# Patient Record
Sex: MALE | Race: WHITE | URBAN - METROPOLITAN AREA
[De-identification: names, ages, dates, MRNs, and addresses within clinical notes are randomized per-mention and may not be internally consistent; named-entity substitution may affect disease eponyms.]

---

## 2019-04-15 ENCOUNTER — CONVERSION ENCOUNTER (OUTPATIENT)
Dept: GASTROENTEROLOGY | Facility: CLINIC | Age: 54
End: 2019-04-15
Attending: INTERNAL MEDICINE

## 2021-01-14 ENCOUNTER — OFFICE VISIT CONVERTED (OUTPATIENT)
Dept: ORTHOPEDIC SURGERY | Facility: CLINIC | Age: 56
End: 2021-01-14
Attending: ORTHOPAEDIC SURGERY

## 2021-01-20 ENCOUNTER — HOSPITAL ENCOUNTER (OUTPATIENT)
Dept: FAMILY MEDICINE CLINIC | Facility: CLINIC | Age: 56
Discharge: HOME | End: 2021-01-20
Attending: PHYSICIAN ASSISTANT

## 2021-01-21 LAB — SARS-COV-2 RNA SPEC QL NAA+PROBE: NOT DETECTED

## 2021-01-25 ENCOUNTER — HOSPITAL ENCOUNTER (OUTPATIENT)
Dept: PERIOP | Facility: HOSPITAL | Age: 56
Setting detail: HOSPITAL OUTPATIENT SURGERY
Discharge: HOME | End: 2021-01-25
Attending: ORTHOPAEDIC SURGERY

## 2021-02-01 ENCOUNTER — HOSPITAL ENCOUNTER (OUTPATIENT)
Dept: PHYSICAL THERAPY | Facility: CLINIC | Age: 56
Setting detail: RECURRING SERIES
Discharge: HOME | End: 2021-04-06
Attending: ORTHOPAEDIC SURGERY

## 2021-02-09 ENCOUNTER — CONVERSION ENCOUNTER (OUTPATIENT)
Dept: ORTHOPEDIC SURGERY | Facility: CLINIC | Age: 56
End: 2021-02-09

## 2021-02-09 ENCOUNTER — OFFICE VISIT CONVERTED (OUTPATIENT)
Dept: ORTHOPEDIC SURGERY | Facility: CLINIC | Age: 56
End: 2021-02-09
Attending: ORTHOPAEDIC SURGERY

## 2021-03-09 ENCOUNTER — OFFICE VISIT CONVERTED (OUTPATIENT)
Dept: ORTHOPEDIC SURGERY | Facility: CLINIC | Age: 56
End: 2021-03-09
Attending: PHYSICIAN ASSISTANT

## 2021-03-09 ENCOUNTER — CONVERSION ENCOUNTER (OUTPATIENT)
Dept: OTHER | Facility: HOSPITAL | Age: 56
End: 2021-03-09

## 2021-03-30 ENCOUNTER — OFFICE VISIT CONVERTED (OUTPATIENT)
Dept: ORTHOPEDIC SURGERY | Facility: CLINIC | Age: 56
End: 2021-03-30
Attending: PHYSICIAN ASSISTANT

## 2021-03-30 ENCOUNTER — CONVERSION ENCOUNTER (OUTPATIENT)
Dept: OTHER | Facility: HOSPITAL | Age: 56
End: 2021-03-30

## 2021-05-10 NOTE — H&P
History and Physical      Patient Name: Davidson Sorenson   Patient ID: [id number]   Sex: Male   YOB: 1965        Visit Date: January 14, 2021    Provider: Kendell Méndez MD   Location: [facility]   Location Address: [address] [place] [address]  [id number]   Location Phone: [phone]          Chief Complaint  · right shoulder pain      History Of Present Illness  Davidson Sorenson is a 55 year old /White male who presents today to [facility].      The patient presents here today for evaluation of his right shoulder. He has been in a dodgeball tournament and injured his shoulder about 2 months ago. He states when the injury happened his arm went numb but denies any numbness at this time. He used to pitch baseball in the past as well.  His PCP orderd an MRI and I reviewed that with him today.  He states he has a sharp pain to the shoulder. He states it is hard to lay on that side when sleeping and the pain does keep him up at night. He does have some popping to the shoulder.              Past Medical History  *No Pertinent Past Medical History         Past Surgical History  Hernia         Medication List  lisinopril 2.5 mg oral tablet         Allergy List  NO KNOWN DRUG ALLERGIES       Allergies Reconciled  Family Medical History  No family history of colorectal cancer         Social History  Alcohol (Current some day); Tobacco (Never)         Review of Systems  · Constitutional  o Denies  o : fever, chills, weight loss  · Cardiovascular  o Denies  o : chest pain, shortness of breath  · Gastrointestinal  o Denies  o : liver disease, heartburn, nausea, blood in stools  · Genitourinary  o Denies  o : painful urination, blood in urine  · Integument  o Denies  o : rash, itching  · Neurologic  o Denies  o : headache, weakness, loss of consciousness  · Musculoskeletal  o Denies  o : painful, swollen joints  · Psychiatric  o Denies  o : drug/alcohol addiction,  "anxiety, depression      Vitals  Date Time BP Position Site L\R Cuff Size HR RR TEMP (F) WT  HT  BMI kg/m2 BSA m2 O2 Sat FR L/min FiO2        01/14/2021 09:33 AM      78 - R   197lbs 0oz 5'  9\" 29.09 2.09 97 %            Physical Examination  · Constitutional  o Appearance  o : well developed, well-nourished, no obvious deformities present  · Head and Face  o Head  o :   § Inspection  § : normocephalic  o Face  o :   § Inspection  § : no facial lesions  · Eyes  o Conjunctivae  o : conjunctivae normal  o Sclerae  o : sclerae white  · Ears, Nose, Mouth and Throat  o Ears  o :   § External Ears  § : appearance within normal limits  § Hearing  § : intact  o Nose  o :   § External Nose  § : appearance normal  · Neck  o Inspection/Palpation  o : normal appearance  o Range of Motion  o : full range of motion  · Respiratory  o Respiratory Effort  o : breathing unlabored  o Inspection of Chest  o : normal appearance  o Auscultation of Lungs  o : no audible wheezing or rales  · Cardiovascular  o Heart  o : regular rate  · Gastrointestinal  o Abdominal Examination  o : soft and non-tender  · Skin and Subcutaneous Tissue  o General Inspection  o : intact, no rashes  · Psychiatric  o General  o : Alert and oriented x3  o Judgement and Insight  o : judgment and insight intact  o Mood and Affect  o : mood normal, affect appropriate  · Right Shoulder  o Inspection  o : No tenderness. No muscle atrophy or swelling. 165 elevation. Abduction 145. ER 90. IR 50. ER to the side 65. IR to the side T12. Negative lift off. 4+ . Positive impingement signs. Pain with cross arm abduction.  · Imaging  o Imaging  o : MRI [facility] 1/2021: High-grade tendinosis versus intra-substance/delaminating tear Supraspinatus tendon measuring approximately 1.7 x 1.8 cm. No definite communication to the articular or bursal surface to suggest a full-thickness tear. Mild acromioclavicular joint arthropathy with some periarticular inflammation. Thickened " coracoacromial ligament with narrowing of the anterior subarcromial outlet. Mild subacromial subdeltoid bursal inflammation.           Assessment  · Right shoulder pain, unspecified chronicity     719.41/M25.511  · Rotator cuff tear     840.4/M75.100      Plan  · Medications  o Medications have been Reconciled  o Transition of Care or Provider Policy  · Instructions  o Reviewed the patient's Past Medical, Social, and Family history as well as the ROS at today's visit, no changes.  o Call or return if worsening symptoms.  o Discussed surgery.  o Risks/benefits discussed with patient including, but not limited to: infection, bleeding, neurovascular damage, malunion, nonunion, aesthetic deformity, need for further surgery, and death.  o Surgery pamphlet given.  o The above service was scribed by Kimmy Lucas on my behalf and I attest to the accuracy of the note. jsb  o Discussed treatment options with the patient. The patient wished due to about to be reassigned in about 3 months, he wishes to proceed with operative treatment. Discussed the risks and benefits of Right Shoulder arthroscopic vs mini open rotator cuff repair, SAD, with possible biceps tenodesis.  o Electronically Identified Patient Education Materials Provided Electronically  · Referrals  o ID: [id number] Date: 01/14/2021 Type: Inbound  Specialty: Orthopedic Surgery            Electronically Signed by: Kimmy Lucas MA -Author on January 14, 2021 10:21:53 AM  Electronically Co-signed by: Kendell Méndez MD -Reviewer on January 14, 2021 09:56:12 PM

## 2021-05-14 VITALS — BODY MASS INDEX: 29.35 KG/M2 | HEIGHT: 69 IN | OXYGEN SATURATION: 97 % | WEIGHT: 198.12 LBS | HEART RATE: 98 BPM

## 2021-05-14 VITALS — BODY MASS INDEX: 29.18 KG/M2 | WEIGHT: 197 LBS | OXYGEN SATURATION: 97 % | HEART RATE: 78 BPM | HEIGHT: 69 IN

## 2021-05-14 VITALS — WEIGHT: 201.25 LBS | OXYGEN SATURATION: 97 % | BODY MASS INDEX: 29.81 KG/M2 | HEIGHT: 69 IN | HEART RATE: 93 BPM

## 2021-05-14 VITALS — HEIGHT: 69 IN | WEIGHT: 196.31 LBS | BODY MASS INDEX: 29.07 KG/M2

## 2021-05-14 NOTE — PROGRESS NOTES
Progress Note      Patient Name: Davidson Sorenson   Patient ID: [id number]   Sex: Male   YOB: 1965    Primary Care Provider: Johnnie Patel MD   Referring Provider: Johnnie Patel MD    Visit Date: March 30, 2021    Provider: Stevan [place] PA-C   Location: [facility]   Location Address: [address] [place] [address]  [id number]   Location Phone: [phone]          Chief Complaint  · Right shoulder pain      History Of Present Illness  Davidson Sorenson is a 55 year old /White male who presents today to [facility]. Patient presents for follow-up evaluation of right shoulder arthroscopic subacromial decompression, mini open rotator cuff repair, subpectoral bicep tenodesis, 1/25/2021. Patient states he has been continuing physical therapy, he states range of motion is improving, he states pain is decreasing, controlled. Patient states he is moving to [place] this Friday and we scheduled this evaluation prior to his move. Patient denies need for pain medication or NSAIDs. Patient states he is now able to use his right arm to drink a glass of water, no new complaints today.       Past Medical History  ***No Significant Medical History; *No Pertinent Past Medical History         Past Surgical History  Hernia         Allergy List  NO KNOWN DRUG ALLERGIES; NO KNOWN DRUG ALLERGIES       Allergies Reconciled  Family Medical History  Cancer, Unspecified; No family history of colorectal cancer         Social History  Alcohol (Current some day); Alcohol Use (Current some day); lives with spouse; .; Recreational Drug Use (Never); Tobacco (Never); Working         Review of Systems  · Constitutional  o Denies  o : fever, chills, weight loss  · Cardiovascular  o Denies  o : chest pain, shortness of breath  · Gastrointestinal  o Denies  o : liver disease, heartburn, nausea, blood in stools  · Genitourinary  o Denies  o : painful urination,  "blood in urine  · Integument  o Denies  o : rash, itching  · Neurologic  o Denies  o : headache, weakness, loss of consciousness  · Musculoskeletal  o Denies  o : painful, swollen joints  · Psychiatric  o Denies  o : drug/alcohol addiction, anxiety, depression      Vitals  Date Time BP Position Site L\R Cuff Size HR RR TEMP (F) WT  HT  BMI kg/m2 BSA m2 O2 Sat FR L/min FiO2        03/30/2021 09:12 AM      98 - R   198lbs 2oz 5'  9\" 29.26 2.09 97 %            Physical Examination  · Constitutional  o Appearance  o : well developed, well-nourished, no obvious deformities present  · Head and Face  o Head  o :   § Inspection  § : normocephalic  o Face  o :   § Inspection  § : no facial lesions  · Eyes  o Conjunctivae  o : conjunctivae normal  o Sclerae  o : sclerae white  · Ears, Nose, Mouth and Throat  o Ears  o :   § External Ears  § : appearance within normal limits  § Hearing  § : intact  o Nose  o :   § External Nose  § : appearance normal  · Neck  o Inspection/Palpation  o : normal appearance  o Range of Motion  o : full range of motion  · Respiratory  o Respiratory Effort  o : breathing unlabored  o Inspection of Chest  o : normal appearance  o Auscultation of Lungs  o : no audible wheezing or rales  · Cardiovascular  o Heart  o : regular rate  · Gastrointestinal  o Abdominal Examination  o : soft and non-tender  · Skin and Subcutaneous Tissue  o General Inspection  o : intact, no rashes  · Psychiatric  o General  o : Alert and oriented x3  o Judgement and Insight  o : judgment and insight intact  o Mood and Affect  o : mood normal, affect appropriate  · Right Shoulder  o Inspection  o : Incisions are well-healed, no erythema, no ecchymosis, no swelling, no signs of infection, active forward elevation 90, passive forward elevation 155, passive abduction 90, external rotation at the side: 50, neurovascularly intact.          Assessment  · Aftercare following surgery of right shoulder arthroscopic subacromial " decompression, mini open rotator cuff repair, subpectoral bicep tenodesis, 1/25/2021     V54.81  · Right shoulder pain, unspecified chronicity     719.41/M25.511      Plan  · Medications  o Medications have been Reconciled  o Transition of Care or Provider Policy  · Instructions  o Reviewed the patient's Past Medical, Social, and Family history as well as the ROS at today's visit, no changes.  o Call or return if worsening symptoms.  o Follow up as needed.  o Discussed with patient that due to his moved to [place] this Friday, we highly recommend that he establish care with orthopedic surgeon and physical therapy as soon as possible to continue recovery from his surgery. Patient states he will do so. Patient was advised to continue home exercise program until he establishes therapy visits. Patient agrees. Follow-up as needed at our office due to his move.  · Referrals  o ID: [id number] Date: 01/14/2021 Type: Inbound  Specialty: Orthopedic Surgery            Electronically Signed by: Stevan [place] PA-C -Author on March 30, 2021 09:39:05 AM  Electronically Co-signed by: Kendell Méndez MD -Reviewer on March 30, 2021 09:49:01 PM

## 2021-05-14 NOTE — PROGRESS NOTES
Progress Note      Patient Name: Davidson Sorenson   Patient ID: [id number]   Sex: Male   YOB: 1965        Visit Date: February 9, 2021    Provider: Kendell Méndez MD   Location: [facility]   Location Address: [address] [place] [address]  [id number]   Location Phone: [phone]          Chief Complaint  · Right shoulder pain      History Of Present Illness  Davidson Sorenson is a 55 year old /White male who presents today to [facility].      The patient presents here today for follow up evaluation of his right shoulder. The patient is S/P Right shoulder arthroscopic subacromial decompression, mini open rotator cuff repair, subpectoral biceps tenodesis, 1/25/2021. His sutures were removed today in clinic. He is overall doing well today. He is wearing the sling today.          Past Medical History  ***No Significant Medical History; *No Pertinent Past Medical History         Past Surgical History  Hernia         Medication List  lisinopril 2.5 mg oral tablet         Allergy List  NO KNOWN DRUG ALLERGIES; NO KNOWN DRUG ALLERGIES       Allergies Reconciled  Family Medical History  Cancer, Unspecified; No family history of colorectal cancer         Social History  Alcohol (Current some day); Alcohol Use (Current some day); lives with spouse; .; Recreational Drug Use (Never); Tobacco (Never); Working         Review of Systems  · Constitutional  o Denies  o : fever, chills, weight loss  · Cardiovascular  o Denies  o : chest pain, shortness of breath  · Gastrointestinal  o Denies  o : liver disease, heartburn, nausea, blood in stools  · Genitourinary  o Denies  o : painful urination, blood in urine  · Integument  o Denies  o : rash, itching  · Neurologic  o Denies  o : headache, weakness, loss of consciousness  · Musculoskeletal  o Denies  o : painful, swollen joints  · Psychiatric  o Denies  o : drug/alcohol addiction, anxiety,  "depression      Vitals  Date Time BP Position Site L\R Cuff Size HR RR TEMP (F) WT  HT  BMI kg/m2 BSA m2 O2 Sat FR L/min FiO2 HC       02/09/2021 02:20 PM         196lbs 5oz 5'  9\" 28.99 2.08             Physical Examination  · Constitutional  o Appearance  o : well developed, well-nourished, no obvious deformities present  · Head and Face  o Head  o :   § Inspection  § : normocephalic  o Face  o :   § Inspection  § : no facial lesions  · Eyes  o Conjunctivae  o : conjunctivae normal  o Sclerae  o : sclerae white  · Ears, Nose, Mouth and Throat  o Ears  o :   § External Ears  § : appearance within normal limits  § Hearing  § : intact  o Nose  o :   § External Nose  § : appearance normal  · Neck  o Inspection/Palpation  o : normal appearance  o Range of Motion  o : full range of motion  · Respiratory  o Respiratory Effort  o : breathing unlabored  o Inspection of Chest  o : normal appearance  o Auscultation of Lungs  o : no audible wheezing or rales  · Cardiovascular  o Heart  o : regular rate  · Gastrointestinal  o Abdominal Examination  o : soft and non-tender  · Skin and Subcutaneous Tissue  o General Inspection  o : intact, no rashes  · Psychiatric  o General  o : Alert and oriented x3  o Judgement and Insight  o : judgment and insight intact  o Mood and Affect  o : mood normal, affect appropriate  · Right Shoulder  o Inspection  o : Incision well healing, no signs of infection. ROM limited secondary to pain. Neurovascularly intact. Sensation intact to light touch.           Assessment  · Aftercare following Right shoulder arthroscopic subacromial decompression, mini open rotator cuff repair, subpectoral biceps tenodesis.      V54.81  · Right shoulder pain, unspecified chronicity     719.41/M25.511      Plan  · Medications  o Medications have been Reconciled  o Transition of Care or Provider Policy  · Instructions  o Reviewed the patient's Past Medical, Social, and Family history as well as the ROS at today's " visit, no changes.  o Call or return if worsening symptoms.  o The above service was scribed by Kimmy Lucas on my behalf and I attest to the accuracy of the note. jsb  o Discussed the treatment plan with the patient. Plan to continue the sling and physical therapy. Follow up in 4 weeks. Remain off work. Work note given today.   o Electronically Identified Patient Education Materials Provided Electronically  · Referrals  o ID: [id number] Date: 01/14/2021 Type: Inbound  Specialty: Orthopedic Surgery            Electronically Signed by: Kimmy Lucas MA -Author on February 9, 2021 02:28:58 PM  Electronically Co-signed by: Kendell Méndez MD -Reviewer on February 9, 2021 08:46:44 PM

## 2021-05-14 NOTE — PROGRESS NOTES
Progress Note      Patient Name: Davidson Sorenson   Patient ID: [id number]   Sex: Male   YOB: 1965        Visit Date: March 9, 2021    Provider: Stevan [place] PA-C   Location: [facility]   Location Address: [address] [place] [address]  [place]  [address]   Location Phone: [phone]          Chief Complaint  · right shoulder pain      History Of Present Illness  Davidson Sorenson is a 55 year old /White male who presents today to [facility]. Patient presents for follow-up evaluation of right shoulder arthroscopic subacromial decompression, mini open rotator cuff repair, subpectoral bicep tenodesis, 1/25/2021. This is the patient's 6-week postop visit, he states that he has been tolerating the pain, states he has some pain with range of motion and with therapy, he states he stopped pain medication 3 days after the surgery and has not been taking any other type of NSAID or pain medication. Patient states he was unclear on how long to use the sling for, he states he stopped it since his last visit but he has been using it with activities to avoid injury. Patient states he has been attending physical therapy 2 times per week. Patient states he is moving to [place] in 4 weeks, he will need to continue follow-up physical therapy visits there.       Past Medical History  ***No Significant Medical History; *No Pertinent Past Medical History         Past Surgical History  Hernia         Medication List  citalopram 10 mg oral tablet; clonidine HCl 0.1 mg oral tablet; trazodone 50 mg oral tablet; Wellbutrin  mg oral tablet extended release 24 hr         Allergy List  NO KNOWN DRUG ALLERGIES; NO KNOWN DRUG ALLERGIES       Allergies Reconciled  Family Medical History  Cancer, Unspecified; No family history of colorectal cancer         Social History  Alcohol (Current some day); Alcohol Use (Current some day); lives with spouse; .;  "Recreational Drug Use (Never); Tobacco (Never); Working         Review of Systems  · Constitutional  o Denies  o : fever, chills, weight loss  · Cardiovascular  o Denies  o : chest pain, shortness of breath  · Gastrointestinal  o Denies  o : liver disease, heartburn, nausea, blood in stools  · Genitourinary  o Denies  o : painful urination, blood in urine  · Integument  o Denies  o : rash, itching  · Neurologic  o Denies  o : headache, weakness, loss of consciousness  · Musculoskeletal  o Denies  o : painful, swollen joints  · Psychiatric  o Denies  o : drug/alcohol addiction, anxiety, depression      Vitals  Date Time BP Position Site L\R Cuff Size HR RR TEMP (F) WT  HT  BMI kg/m2 BSA m2 O2 Sat FR L/min FiO2 HC       03/09/2021 09:05 AM      93 - R   201lbs 4oz 5'  9\" 29.72 2.11 97 %            Physical Examination  · Constitutional  o Appearance  o : well developed, well-nourished, no obvious deformities present  · Head and Face  o Head  o :   § Inspection  § : normocephalic  o Face  o :   § Inspection  § : no facial lesions  · Eyes  o Conjunctivae  o : conjunctivae normal  o Sclerae  o : sclerae white  · Ears, Nose, Mouth and Throat  o Ears  o :   § External Ears  § : appearance within normal limits  § Hearing  § : intact  o Nose  o :   § External Nose  § : appearance normal  · Neck  o Inspection/Palpation  o : normal appearance  o Range of Motion  o : full range of motion  · Respiratory  o Respiratory Effort  o : breathing unlabored  o Inspection of Chest  o : normal appearance  o Auscultation of Lungs  o : no audible wheezing or rales  · Cardiovascular  o Heart  o : regular rate  · Gastrointestinal  o Abdominal Examination  o : soft and non-tender  · Skin and Subcutaneous Tissue  o General Inspection  o : intact, no rashes  · Psychiatric  o General  o : Alert and oriented x3  o Judgement and Insight  o : judgment and insight intact  o Mood and Affect  o : mood normal, affect appropriate  · Right " Shoulder  o Inspection  o : Incisions are well-healed, no erythema, no ecchymosis, no swelling, no signs of infection. Nontender to palpation. passive forward elevation 130, passive abduction 90, mild pain with abduction, passive external rotation with abduction 35, passive internal rotation 30          Assessment  · Aftercare following surgery of right shoulder arthroscopic subacromial decompression, mini open rotator cuff repair, subpectoral bicep tenodesis, 1/25/2021     V54.81  · Right shoulder pain, unspecified chronicity     719.41/M25.511      Plan  · Medications  o Medications have been Reconciled  o Transition of Care or Provider Policy  · Instructions  o Reviewed the patient's Past Medical, Social, and Family history as well as the ROS at today's visit, no changes.  o Call or return if worsening symptoms.  o Follow Up in 3 weeks.  o Advised patient that he should begin setting up follow-up care in [place] when he moves in 4 weeks for continuous physical therapy evaluations, patient was advised to continue physical therapy until he moves and new order was written, continue to work on strength and range of motion with therapy. Follow-up in 3 weeks for evaluation prior to patient moving.  · Referrals  o ID: [id number] Date: 01/14/2021 Type: Inbound  Specialty: Orthopedic Surgery            Electronically Signed by: CAPRICE Muller-C -Author on March 9, 2021 10:05:46 AM  Electronically Co-signed by: Kendell Méndez MD -Reviewer on March 9, 2021 09:31:47 PM